# Patient Record
Sex: FEMALE | Race: WHITE | NOT HISPANIC OR LATINO | Employment: FULL TIME | URBAN - METROPOLITAN AREA
[De-identification: names, ages, dates, MRNs, and addresses within clinical notes are randomized per-mention and may not be internally consistent; named-entity substitution may affect disease eponyms.]

---

## 2017-04-25 ENCOUNTER — TRANSCRIBE ORDERS (OUTPATIENT)
Dept: ADMINISTRATIVE | Facility: HOSPITAL | Age: 62
End: 2017-04-25

## 2017-04-25 DIAGNOSIS — Z12.31 VISIT FOR SCREENING MAMMOGRAM: Primary | ICD-10-CM

## 2017-06-28 ENCOUNTER — HOSPITAL ENCOUNTER (OUTPATIENT)
Dept: MAMMOGRAPHY | Facility: HOSPITAL | Age: 62
Discharge: HOME | End: 2017-06-28
Admitting: FAMILY MEDICINE

## 2017-06-28 DIAGNOSIS — Z12.31 VISIT FOR SCREENING MAMMOGRAM: ICD-10-CM

## 2017-06-28 PROCEDURE — 77067 SCR MAMMO BI INCL CAD: CPT | Performed by: RADIOLOGY

## 2017-06-28 PROCEDURE — 77063 BREAST TOMOSYNTHESIS BI: CPT | Performed by: RADIOLOGY

## 2017-06-28 PROCEDURE — 77063 BREAST TOMOSYNTHESIS BI: CPT

## 2017-06-28 PROCEDURE — G0202 SCR MAMMO BI INCL CAD: HCPCS

## 2019-08-20 ENCOUNTER — OFFICE VISIT (OUTPATIENT)
Dept: OBSTETRICS AND GYNECOLOGY | Facility: CLINIC | Age: 64
End: 2019-08-20

## 2019-08-20 VITALS
DIASTOLIC BLOOD PRESSURE: 68 MMHG | SYSTOLIC BLOOD PRESSURE: 110 MMHG | BODY MASS INDEX: 23.21 KG/M2 | HEIGHT: 66 IN | WEIGHT: 144.4 LBS

## 2019-08-20 DIAGNOSIS — Z01.419 ENCOUNTER FOR GYNECOLOGICAL EXAMINATION WITHOUT ABNORMAL FINDING: Primary | ICD-10-CM

## 2019-08-20 DIAGNOSIS — N95.2 VAGINAL ATROPHY: ICD-10-CM

## 2019-08-20 DIAGNOSIS — Z78.0 MENOPAUSE: ICD-10-CM

## 2019-08-20 PROCEDURE — 99386 PREV VISIT NEW AGE 40-64: CPT | Performed by: OBSTETRICS & GYNECOLOGY

## 2019-08-20 RX ORDER — BIOTIN 5 MG
1 TABLET ORAL DAILY
COMMUNITY

## 2019-08-20 RX ORDER — ESTRADIOL 0.1 MG/G
0.5 CREAM VAGINAL 2 TIMES WEEKLY
Qty: 42.5 G | Refills: 3 | Status: SHIPPED | OUTPATIENT
Start: 2019-08-22 | End: 2020-08-21

## 2019-08-20 RX ORDER — ESTRADIOL 0.1 MG/G
1 CREAM VAGINAL AS NEEDED
COMMUNITY
End: 2019-08-20 | Stop reason: SDUPTHER

## 2019-08-20 RX ORDER — VALACYCLOVIR HYDROCHLORIDE 1 G/1
TABLET, FILM COATED ORAL
COMMUNITY

## 2019-08-20 NOTE — PROGRESS NOTES
Chief Complaint   Patient presents with   • Gynecologic Exam       Bre Mckeon is a 64 y.o. year old  presenting to be seen for her annual exam.  Patient has been absent from our care for several years.  She is menopausal and uses estradiol vaginal cream and a dose of 0.5 g twice a week to treat symptoms of vaginal atrophy.  She has no side effects on this medication.  She has a prior history of CO2 laser ablation of her cervix over 30 years ago.  Follow-up Pap tests have been normal.  She has had a TVT procedure.  She has had bilateral breast augmentation.  She has had a suction D&C for a missed AB.  She denies bowel or urinary symptoms.    SCREENING TESTS  No Pap test Data available  Year 2012   Age                         PAP                         HPV high risk                         Mammogram      benign                   KOSTA score                         Breast MRI                         Lipids                         Vitamin D                         Colonoscopy                         DEXA  Frax (hip/any)                         Ovarian Screen                             She exercises regularly: yes.  She wears her seat belt: yes.  She has concerns about domestic violence: no.  She has noticed changes in height: no    GYN screening history:  · Last mammogram: was done on approximately 2017 and the result was: Birads II (Benign findings)..    No Additional Complaints Reported    The following portions of the patient's history were reviewed and updated as appropriate:vital signs and   She  has a past medical history of Abnormal Pap smear of cervix, Fibroid, Lichen sclerosus et atrophicus of the vulva, and Osteopenia.  She does not have any pertinent problems on file.  She  has a past surgical history that includes Augmentation mammaplasty; Laser ablation of the cervix; Bladder  "suspension; d&c with suction; and Bunionectomy (Left).  Her family history includes Breast cancer (age of onset: 65) in her maternal aunt; Breast cancer (age of onset: 70) in her maternal aunt; Osteoporosis in her mother.  She  reports that she has never smoked. She has never used smokeless tobacco. She reports that she drinks alcohol. She reports that she does not use drugs.  Current Outpatient Medications   Medication Sig Dispense Refill   • Krill Oil 1000 MG capsule Take 1 capsule by mouth Daily.     • [START ON 8/22/2019] estradiol (ESTRACE VAGINAL) 0.1 MG/GM vaginal cream Insert 0.5 g into the vagina 2 (Two) Times a Week. 42.5 g 3   • Multiple Vitamins-Minerals (MULTIVITAMIN ADULT PO) Take 1 tablet by mouth Daily.     • valACYclovir (VALTREX) 1000 MG tablet Valtrex 1 gram tablet   2 po q 12 hrs for two doses for fever blisters       No current facility-administered medications for this visit.      She is allergic to penicillins..    Review of Systems  A comprehensive review of systems was taken.  Constitutional: negative for fever, chills, activity change, appetite change, fatigue and unexpected weight change.  Respiratory: negative  Cardiovascular: negative  Gastrointestinal: negative  Genitourinary:negative  Musculoskeletal:negative  Behavioral/Psych: negative       /68   Ht 167.6 cm (66\")   Wt 65.5 kg (144 lb 6.4 oz)   Breastfeeding? No   BMI 23.31 kg/m²     Physical Exam    General:  alert; cooperative; well developed; well nourished   Skin:  No suspicious lesions seen   Thyroid: normal to inspection and palpation   Lungs:  clear to auscultation bilaterally   Heart:  regular rate and rhythm, S1, S2 normal, no murmur, click, rub or gallop   Breasts:  Examined in supine position  Symmetric without masses or skin dimpling  Nipples normal without inversion, lesions or discharge  There are no palpable axillary nodes  Bilateral implants are noted without obvious palpable abnormalities   Abdomen: soft, " non-tender; no masses  no umbilical or inguinal hernias are present  no hepato-splenomegaly   Pelvis: Clinical staff was present for exam  External genitalia:  normal appearance of the external genitalia including Bartholin's and [place]'s glands.  Vaginal:  atrophic mucosal changes are present;  Cervix:  normal appearance.  Uterus:  normal size, shape and consistency. anteverted;  Adnexa:  non palpable bilaterally.  Rectal:  anus visually normal appearing. recto-vaginal exam unremarkable and confirms findings;     Lab Review    No data reviewed    Imaging  Mammogram results- Birads II         ASSESSMENT  Problems Addressed this Visit        Genitourinary    Menopause      Other Visit Diagnoses     Encounter for gynecological examination without abnormal finding    -  Primary    Relevant Orders    Liquid-based Pap Smear, Screening    Vaginal atrophy        Relevant Medications    estradiol (ESTRACE VAGINAL) 0.1 MG/GM vaginal cream (Start on 8/22/2019)          PLAN    Medications prescribed this encounter:    New Medications Ordered This Visit   Medications   • estradiol (ESTRACE VAGINAL) 0.1 MG/GM vaginal cream     Sig: Insert 0.5 g into the vagina 2 (Two) Times a Week.     Dispense:  42.5 g     Refill:  3   · Pap test done  · Monthly self breast assessment and annual breast imaging  · Calcium, 600 mg/ Vit. D, 400 IU daily; regular weight-bearing exercise  · Follow up: 12 month(s)  *Please note that portions of this documentation may have been completed with a voice recognition program.  Efforts were made to edit this dictation, but occasional words may have been mistranscribed.       This note was electronically signed.    YOANDY Harman MD  August 20, 2019  3:49 PM

## 2019-11-22 ENCOUNTER — TRANSCRIBE ORDERS (OUTPATIENT)
Dept: ADMINISTRATIVE | Facility: HOSPITAL | Age: 64
End: 2019-11-22

## 2019-11-22 DIAGNOSIS — Z12.31 VISIT FOR SCREENING MAMMOGRAM: Primary | ICD-10-CM

## 2020-08-27 ENCOUNTER — OFFICE VISIT (OUTPATIENT)
Dept: OBSTETRICS AND GYNECOLOGY | Facility: CLINIC | Age: 65
End: 2020-08-27

## 2020-08-27 VITALS
SYSTOLIC BLOOD PRESSURE: 110 MMHG | TEMPERATURE: 97.8 F | WEIGHT: 156.2 LBS | HEIGHT: 66 IN | DIASTOLIC BLOOD PRESSURE: 62 MMHG | BODY MASS INDEX: 25.1 KG/M2

## 2020-08-27 DIAGNOSIS — Z01.419 ENCOUNTER FOR GYNECOLOGICAL EXAMINATION WITHOUT ABNORMAL FINDING: ICD-10-CM

## 2020-08-27 DIAGNOSIS — N95.2 VAGINAL ATROPHY: ICD-10-CM

## 2020-08-27 DIAGNOSIS — Z78.0 MENOPAUSE: Primary | ICD-10-CM

## 2020-08-27 PROCEDURE — 99396 PREV VISIT EST AGE 40-64: CPT | Performed by: OBSTETRICS & GYNECOLOGY

## 2020-08-27 RX ORDER — ESTRADIOL 0.1 MG/G
0.5 CREAM VAGINAL 2 TIMES WEEKLY
COMMUNITY
End: 2020-08-27 | Stop reason: SDUPTHER

## 2020-08-27 RX ORDER — ESTRADIOL 0.1 MG/G
0.5 CREAM VAGINAL 2 TIMES WEEKLY
Qty: 42.5 G | Refills: 2 | Status: SHIPPED | OUTPATIENT
Start: 2020-08-27 | End: 2021-08-27

## 2020-08-27 NOTE — PROGRESS NOTES
Chief Complaint   Patient presents with   • Annual Exam   • Shan Shaffer       Bre Mckeon is a 65 y.o. year old  presenting to be seen for her annual exam.  This patient is menopausal and does not use systemic estrogen/progestin replacement therapy.  She is currently treated for vaginal atrophy with estradiol cream, 0.5 g intravaginally twice a week she has previously had a suction D&C for spontaneous AB; CO2 laser ablation of the cervix for dysplasia; bilateral breast augmentation; and a TVT procedure for incontinence.  She denies bowel or urinary symptoms.    SCREENING TESTS    Year 2012   Age                         PAP        Neg.                 HPV high risk                         Mammogram      benign                   KOSTA score                         Breast MRI                         Lipids                         Vitamin D                         Colonoscopy                         DEXA  Frax (hip/any)                         Ovarian Screen                             She exercises regularly: yes.  She wears her seat belt: yes.  She has concerns about domestic violence: no.  She has noticed changes in height: no    GYN screening history:  · Last pap: was done on approximately 2019 and the result was: normal PAP.  · Last mammogram: was done on approximately 2017 and the result was: Birads II (Benign findings)..    No Additional Complaints Reported    The following portions of the patient's history were reviewed and updated as appropriate:vital signs and   She  has a past medical history of Abnormal Pap smear of cervix, Fibroid, Lichen sclerosus et atrophicus of the vulva, and Osteopenia.  She does not have any pertinent problems on file.  She  has a past surgical history that includes Augmentation mammaplasty; Laser ablation of the cervix; Bladder suspension; d&c with suction;  "and Bunionectomy (Left).  Her family history includes Breast cancer (age of onset: 65) in her maternal aunt; Breast cancer (age of onset: 70) in her maternal aunt; Osteoporosis in her mother.  She  reports that she has never smoked. She has never used smokeless tobacco. She reports that she drinks alcohol. She reports that she does not use drugs.  Current Outpatient Medications   Medication Sig Dispense Refill   • estradiol (ESTRACE) 0.1 MG/GM vaginal cream Insert 0.5 g into the vagina 2 (Two) Times a Week. 42.5 g 2   • Krill Oil 1000 MG capsule Take 1 capsule by mouth Daily.     • Multiple Vitamins-Minerals (MULTIVITAMIN ADULT PO) Take 1 tablet by mouth Daily.     • valACYclovir (VALTREX) 1000 MG tablet Valtrex 1 gram tablet   2 po q 12 hrs for two doses for fever blisters       No current facility-administered medications for this visit.      She is allergic to penicillins..    Review of Systems  A review of systems was taken.  She denies cough, fever, shortness of breath, or loss of her sense of taste or smell.  Constitutional: negative for fever, chills, activity change, appetite change, fatigue and unexpected weight change.  Respiratory: negative  Cardiovascular: negative  Gastrointestinal: negative  Genitourinary:negative  Musculoskeletal:negative  Behavioral/Psych: negative       /62   Temp 97.8 °F (36.6 °C)   Ht 167.6 cm (66\")   Wt 70.9 kg (156 lb 3.2 oz)   Breastfeeding No   BMI 25.21 kg/m²     Physical Exam    General:  alert; cooperative; well developed; well nourished   Skin:  No suspicious lesions seen   Thyroid: normal to inspection and palpation   Lungs:  clear to auscultation bilaterally   Heart:  regular rate and rhythm, S1, S2 normal, no murmur, click, rub or gallop   Breasts:  Examined in supine position  Symmetric without masses or skin dimpling  Nipples normal without inversion, lesions or discharge  There are no palpable axillary nodes  Bilateral implants are noted without obvious " palpable abnormalities   Abdomen: soft, non-tender; no masses  no umbilical or inguinal hernias are present  no hepato-splenomegaly   Pelvis: Clinical staff was present for exam  External genitalia:  normal appearance of the external genitalia including Bartholin's and [place]'s glands.  Vaginal:  atrophic mucosal changes are present;  Cervix:  normal appearance.  Uterus:  normal size, shape and consistency. anteverted;  Adnexa:  non palpable bilaterally.  Rectal:  anus visually normal appearing. recto-vaginal exam unremarkable and confirms findings;     Lab Review   No data reviewed    Imaging  Mammogram results         ASSESSMENT  Problems Addressed this Visit        Genitourinary    Menopause - Primary    Vaginal atrophy    Relevant Medications    estradiol (ESTRACE) 0.1 MG/GM vaginal cream      Other Visit Diagnoses     Encounter for gynecological examination without abnormal finding                  Substance History:   reports that she has never smoked. She has never used smokeless tobacco.   reports that she drinks alcohol.   reports that she does not use drugs.    Substance use counseling is not indicated based on patient history.  She indicates that her alcohol intake is social, and controlled.      PLAN    Medications prescribed this encounter:    New Medications Ordered This Visit   Medications   • estradiol (ESTRACE) 0.1 MG/GM vaginal cream     Sig: Insert 0.5 g into the vagina 2 (Two) Times a Week.     Dispense:  42.5 g     Refill:  2   · Monthly self breast assessment, the patient must schedule breast imaging  · Calcium, 600 mg/ Vit. D, 400 IU daily; regular weight-bearing exercise  · Follow up: 12 month(s)  *Please note that portions of this documentation may have been completed with a voice recognition program.  Efforts were made to edit this dictation, but occasional words may have been mistranscribed.       This note was electronically signed.    YOANDY Harman MD  August 27, 2020  09:39

## 2020-09-17 ENCOUNTER — HOSPITAL ENCOUNTER (OUTPATIENT)
Dept: MAMMOGRAPHY | Facility: HOSPITAL | Age: 65
Discharge: HOME | End: 2020-09-17
Admitting: FAMILY MEDICINE

## 2020-09-17 DIAGNOSIS — Z12.31 VISIT FOR SCREENING MAMMOGRAM: ICD-10-CM

## 2020-09-17 PROCEDURE — 77067 SCR MAMMO BI INCL CAD: CPT

## 2020-09-17 PROCEDURE — 77063 BREAST TOMOSYNTHESIS BI: CPT | Performed by: RADIOLOGY

## 2020-09-17 PROCEDURE — 77067 SCR MAMMO BI INCL CAD: CPT | Performed by: RADIOLOGY

## 2020-09-17 PROCEDURE — 77063 BREAST TOMOSYNTHESIS BI: CPT

## 2021-09-01 ENCOUNTER — OFFICE VISIT (OUTPATIENT)
Dept: OBSTETRICS AND GYNECOLOGY | Facility: CLINIC | Age: 66
End: 2021-09-01

## 2021-09-01 VITALS
HEIGHT: 66 IN | DIASTOLIC BLOOD PRESSURE: 68 MMHG | WEIGHT: 148 LBS | BODY MASS INDEX: 23.78 KG/M2 | SYSTOLIC BLOOD PRESSURE: 118 MMHG

## 2021-09-01 DIAGNOSIS — Z01.419 ENCOUNTER FOR GYNECOLOGICAL EXAMINATION WITHOUT ABNORMAL FINDING: Primary | ICD-10-CM

## 2021-09-01 DIAGNOSIS — Z78.0 MENOPAUSE: ICD-10-CM

## 2021-09-01 DIAGNOSIS — N95.2 VAGINAL ATROPHY: ICD-10-CM

## 2021-09-01 PROCEDURE — 99397 PER PM REEVAL EST PAT 65+ YR: CPT | Performed by: OBSTETRICS & GYNECOLOGY

## 2021-09-01 RX ORDER — ESTRADIOL 0.1 MG/G
0.5 CREAM VAGINAL 2 TIMES WEEKLY
COMMUNITY
End: 2021-12-20 | Stop reason: SDUPTHER

## 2021-09-01 RX ORDER — ASPIRIN 81 MG/1
81 TABLET ORAL EVERY OTHER DAY
COMMUNITY

## 2021-09-01 NOTE — PROGRESS NOTES
Chief Complaint   Patient presents with   • Annual Exam   • Med Refill     patient will call for refills on estrace vaginal cream.       Bre Mckeon is a 66 y.o. year old  presenting to be seen for her annual exam.  This patient is menopausal and does not use systemic estrogen/progestin replacement therapy.  Vaginal atrophy is treated with estradiol cream in a dose of 0.5 g intravaginally twice a week.  She has no side effects on this medication.  She has previously had CO2 laser ablation of her cervix, a suction D&C for a missed AB, bilateral breast augmentation, and a TVT procedure.  She denies bowel or urinary symptoms.  She has had Covid-19 immunization.    SCREENING TESTS    Year 2012   Age                         PAP        Neg.                 HPV high risk                         Mammogram        benign benign                KOSTA score                         Breast MRI                         Lipids                         Vitamin D                         Colonoscopy                         DEXA  Frax (hip/any)                         Ovarian Screen                             She exercises regularly: yes.  She wears her seat belt: yes.  She has concerns about domestic violence: no.  She has noticed changes in height: no    GYN screening history:  · Last pap: was done on approximately 2019 and the result was: normal PAP.  · Last mammogram: was done on approximately 2020 and the result was: Birads I (Normal)..    No Additional Complaints Reported    The following portions of the patient's history were reviewed and updated as appropriate:vital signs and   She  has a past medical history of Abnormal Pap smear of cervix, Family history of DVT, Fibroid, Lichen sclerosus et atrophicus of the vulva, and Osteopenia.  She does not have any pertinent problems on file.  She  has a past  "surgical history that includes Laser ablation of the cervix; Bladder suspension; d & c with suction; Bunionectomy (Left); and Augmentation mammaplasty (Bilateral, 1988).  Her family history includes Breast cancer (age of onset: 65) in her maternal aunt; Breast cancer (age of onset: 70) in her maternal aunt; Breast cancer (age of onset: 75) in her paternal uncle; Deep vein thrombosis in her mother; Osteoporosis in her mother.  She  reports that she has never smoked. She has never used smokeless tobacco. She reports current alcohol use. She reports that she does not use drugs.  Current Outpatient Medications   Medication Sig Dispense Refill   • aspirin 81 MG EC tablet Take 81 mg by mouth Every Other Day.     • calcium carbonate-vitamin d (Calcium 600+D) 600-400 MG-UNIT per tablet Take 1 tablet by mouth Daily.     • estradiol (ESTRACE) 0.1 MG/GM vaginal cream Insert 0.5 g into the vagina 2 (Two) Times a Week.     • Krill Oil 1000 MG capsule Take 1 capsule by mouth Daily.     • Multiple Vitamins-Minerals (MULTIVITAMIN ADULT PO) Take 1 tablet by mouth Daily.     • valACYclovir (VALTREX) 1000 MG tablet Valtrex 1 gram tablet   2 po q 12 hrs for two doses for fever blisters       No current facility-administered medications for this visit.     She is allergic to penicillins..    Review of Systems  A review of systems was taken.  Constitutional: negative for fever, chills, activity change, appetite change, fatigue and unexpected weight change.  Respiratory: negative  Cardiovascular: negative  Gastrointestinal: negative  Genitourinary:negative  Musculoskeletal:negative  Behavioral/Psych: negative     Counseling/Anticipatory Guidance Discussed: nutrition, physical activity, healthy weight, immunizations, screenings and self-breast exam      /68   Ht 167.6 cm (66\")   Wt 67.1 kg (148 lb)   Breastfeeding No   BMI 23.89 kg/m²     BMI reviewed     MEDICALLY INDICATED   Physical Exam    Neuro: alert and oriented to person, " place and time    General:  alert; cooperative; well developed; well nourished   Skin:  No suspicious lesions seen   Thyroid: normal to inspection and palpation   Lungs:  breathing is unlabored  clear to auscultation bilaterally   Heart:  regular rate and rhythm, S1, S2 normal, no murmur, click, rub or gallop  normal apical impulse   Breasts:  Examined in supine position  Symmetric without masses or skin dimpling  Nipples normal without inversion, lesions or discharge  There are no palpable axillary nodes  Bilateral implants are noted without obvious palpable abnormalities   Abdomen: soft, non-tender; no masses  no umbilical or inguinal hernias are present  no hepato-splenomegaly   Pelvis: Clinical staff was present for exam  External genitalia:  normal appearance of the external genitalia including Bartholin's and [place]'s glands.  Vaginal:  normal pink mucosa without prolapse or lesions.  Cervix:  normal appearance.  Uterus:  normal size, shape and consistency. anteverted;  Adnexa:  non palpable bilaterally.  Rectal:  anus visually normal appearing. recto-vaginal exam unremarkable and confirms findings;     Lab Review   Pap results    Imaging  Mammogram results              ASSESSMENT  Problems Addressed this Visit        Genitourinary and Reproductive     Menopause    Vaginal atrophy      Other Visit Diagnoses     Encounter for gynecological examination without abnormal finding    -  Primary      Diagnoses       Codes Comments    Encounter for gynecological examination without abnormal finding    -  Primary ICD-10-CM: Z01.419  ICD-9-CM: V72.31     Menopause     ICD-10-CM: Z78.0  ICD-9-CM: 627.2     Vaginal atrophy     ICD-10-CM: N95.2  ICD-9-CM: 627.3               Substance History:   reports that she has never smoked. She has never used smokeless tobacco.   reports current alcohol use.   reports no history of drug use.    Substance use counseling is not indicated based on patient history.  She indicates that her  alcohol intake is social, and controlled.      PLAN    · Medications prescribed this encounter:  No orders of the defined types were placed in this encounter.  · Monthly self breast assessment and annual breast imaging  · Calcium, 600 mg/ Vit. D, 400 IU daily; regular weight-bearing exercise  · Follow up: 12 month(s)  *Please note that portions of this documentation may have been completed with a voice recognition program.  Efforts were made to edit this dictation, but occasional words may have been mistranscribed.       This note was electronically signed.    YOANDY Harman MD  September 1, 2021  10:37 EDT

## 2021-12-20 ENCOUNTER — TELEPHONE (OUTPATIENT)
Dept: OBSTETRICS AND GYNECOLOGY | Facility: CLINIC | Age: 66
End: 2021-12-20

## 2021-12-20 RX ORDER — ESTRADIOL 0.1 MG/G
0.5 CREAM VAGINAL 2 TIMES WEEKLY
Qty: 1 EACH | Refills: 3 | Status: SHIPPED | OUTPATIENT
Start: 2021-12-20 | End: 2022-09-02 | Stop reason: SDUPTHER

## 2021-12-20 NOTE — TELEPHONE ENCOUNTER
Patient was calling for a refill of Estrace.  She states that Kimani was to fill this at her last visit (9/2021) but I didn't see any record of that in the system.  She is scheduled with Tricia in September and is wondering if we can send this in for her until the time she has her annual.

## 2022-04-22 ENCOUNTER — TELEPHONE (OUTPATIENT)
Dept: OBSTETRICS AND GYNECOLOGY | Facility: CLINIC | Age: 67
End: 2022-04-22

## 2022-04-22 RX ORDER — FLUCONAZOLE 150 MG/1
150 TABLET ORAL DAILY
Qty: 1 TABLET | Refills: 0 | Status: SHIPPED | OUTPATIENT
Start: 2022-04-22 | End: 2022-09-02

## 2022-04-28 ENCOUNTER — TRANSCRIBE ORDERS (OUTPATIENT)
Dept: ADMINISTRATIVE | Facility: HOSPITAL | Age: 67
End: 2022-04-28

## 2022-04-28 DIAGNOSIS — Z12.31 VISIT FOR SCREENING MAMMOGRAM: Primary | ICD-10-CM

## 2022-05-26 ENCOUNTER — APPOINTMENT (OUTPATIENT)
Dept: MAMMOGRAPHY | Facility: HOSPITAL | Age: 67
End: 2022-05-26

## 2022-06-08 ENCOUNTER — APPOINTMENT (OUTPATIENT)
Dept: MAMMOGRAPHY | Facility: HOSPITAL | Age: 67
End: 2022-06-08

## 2022-06-23 ENCOUNTER — APPOINTMENT (OUTPATIENT)
Dept: MAMMOGRAPHY | Facility: HOSPITAL | Age: 67
End: 2022-06-23

## 2022-07-11 ENCOUNTER — HOSPITAL ENCOUNTER (OUTPATIENT)
Dept: MAMMOGRAPHY | Facility: HOSPITAL | Age: 67
Discharge: HOME | End: 2022-07-11
Admitting: INTERNAL MEDICINE

## 2022-07-11 ENCOUNTER — HOSPITAL ENCOUNTER (OUTPATIENT)
Dept: MAMMOGRAPHY | Facility: HOSPITAL | Age: 67
Discharge: HOME | End: 2022-07-11

## 2022-07-11 DIAGNOSIS — Z12.31 VISIT FOR SCREENING MAMMOGRAM: ICD-10-CM

## 2022-07-11 PROCEDURE — 77067 SCR MAMMO BI INCL CAD: CPT

## 2022-07-11 PROCEDURE — 77063 BREAST TOMOSYNTHESIS BI: CPT | Performed by: RADIOLOGY

## 2022-07-11 PROCEDURE — 77063 BREAST TOMOSYNTHESIS BI: CPT

## 2022-07-11 PROCEDURE — 77067 SCR MAMMO BI INCL CAD: CPT | Performed by: RADIOLOGY

## 2022-09-02 ENCOUNTER — OFFICE VISIT (OUTPATIENT)
Dept: OBSTETRICS AND GYNECOLOGY | Facility: CLINIC | Age: 67
End: 2022-09-02

## 2022-09-02 VITALS
DIASTOLIC BLOOD PRESSURE: 70 MMHG | BODY MASS INDEX: 23.73 KG/M2 | RESPIRATION RATE: 16 BRPM | SYSTOLIC BLOOD PRESSURE: 118 MMHG | WEIGHT: 147 LBS

## 2022-09-02 DIAGNOSIS — M85.80 OSTEOPENIA, UNSPECIFIED LOCATION: ICD-10-CM

## 2022-09-02 DIAGNOSIS — N95.2 VAGINAL ATROPHY: ICD-10-CM

## 2022-09-02 DIAGNOSIS — Z87.42 HISTORY OF ABNORMAL CERVICAL PAP SMEAR: ICD-10-CM

## 2022-09-02 DIAGNOSIS — Z01.419 ENCOUNTER FOR GYNECOLOGICAL EXAMINATION WITHOUT ABNORMAL FINDING: Primary | ICD-10-CM

## 2022-09-02 PROCEDURE — 99397 PER PM REEVAL EST PAT 65+ YR: CPT | Performed by: NURSE PRACTITIONER

## 2022-09-02 RX ORDER — MIDODRINE HYDROCHLORIDE 5 MG/1
5 TABLET ORAL 3 TIMES DAILY
COMMUNITY
Start: 2022-07-17

## 2022-09-02 RX ORDER — ESTRADIOL 0.1 MG/G
2 CREAM VAGINAL DAILY
COMMUNITY
End: 2022-09-02 | Stop reason: DRUGHIGH

## 2022-09-02 RX ORDER — ESTRADIOL 0.1 MG/G
0.5 CREAM VAGINAL 2 TIMES WEEKLY
Qty: 1 EACH | Refills: 3 | Status: SHIPPED | OUTPATIENT
Start: 2022-09-05

## 2022-09-02 NOTE — PROGRESS NOTES
Annual Visit     Patient Name: Bre Mckeon  : 1955   MRN: [id number]   Care Team: Patient Care Team:  Inés Sheets MD as PCP - General  Inés Sheets MD as PCP - Family Medicine  Shukri Harman MD (Inactive) as Consulting Physician (Gynecology)    Chief Complaint:    Chief Complaint   Patient presents with   • Annual Exam       HPI: Bre Mckeon is a 67 y.o. year old  presenting to be seen for her gynecologic exam.   Pap smear 2019 WNL   Reports hx of abnormal pap results in the last 10 yrs - states she was seen briefly by another provider   Hx laser txment of cervix many yrs ago     Doing well with estradiol vaginal cream - needs refills     Mammogram 2022 birads 1     Colonoscopy UTD per pt - PCP orders     DEXA done at [facility] with PCP   Hx osteopenia   Takes daily calcium and vit d     Hx TVT - SUMA is minimal now     Was a pt of Dr. Figueredo then Dr. Carrero, then Dr. Harman       Subjective      /70   Resp 16   Wt 66.7 kg (147 lb)   Breastfeeding No   BMI 23.73 kg/m²     BMI reviewed: Body mass index is 23.73 kg/m².      Objective     Physical Exam    Neuro: alert and oriented to person, place and time   General:  alert; cooperative; well developed; well nourished   Skin:  No suspicious lesions seen   Thyroid: normal to inspection and palpation   Lungs:  breathing is unlabored  clear to auscultation bilaterally   Heart:  regular rate and rhythm, S1, S2 normal, no murmur, click, rub or gallop  normal apical impulse   Breasts:  Examined in supine position  Symmetric without masses or skin dimpling  Nipples normal without inversion, lesions or discharge  There are no palpable axillary nodes  Bilateral implants are noted without obvious palpable abnormalities   Abdomen: soft, non-tender; no masses  no umbilical or inguinal hernias are present  no hepato-splenomegaly   Pelvis: Clinical staff was present for exam  External genitalia:  normal  appearance of the external genitalia including Bartholin's and [place]'s glands.  :  urethral meatus normal;  Vaginal:  normal pink mucosa without prolapse or lesions.  Cervix:  normal appearance.  Uterus:  normal size, shape and consistency.  Adnexa:  normal bimanual exam of the adnexa.  Rectal:  digital rectal exam not performed; anus visually normal appearing.         Assessment / Plan      Assessment  Problems Addressed This Visit    ICD-10-CM ICD-9-CM   1. Encounter for gynecological examination without abnormal finding  Z01.419 V72.31   2. History of abnormal cervical Pap smear  Z87.42 V13.29   3. Vaginal atrophy  N95.2 627.3   4. Osteopenia, unspecified location  M85.80 733.90       Plan    Pap smear pending  Discussed cessation of pap smear at age 66, but considering hx of abnormal pap results will rpt pap today     Discussed monthly SBEs and importance of annual imaging     Cont with estrace vaginal cream - refills to pharmacy     Discussed osteopenia and Calcium, 600 mg/ Vit. D, 400 IU daily; regular weight-bearing exercise    AV 1 yr         Follow Up  Return in about 1 year (around 9/2/2023) for Annual physical.  Patient was given instructions and counseling regarding her condition or for health maintenance advice. Please see specific information pulled into the AVS if appropriate.     Tricia Purvis, HALIE  September 2, 2022  10:31 EDT

## 2022-09-09 LAB — REF LAB TEST METHOD: NORMAL

## 2022-12-19 ENCOUNTER — TELEPHONE (OUTPATIENT)
Dept: OBSTETRICS AND GYNECOLOGY | Facility: CLINIC | Age: 67
End: 2022-12-19

## 2022-12-19 RX ORDER — FLUCONAZOLE 150 MG/1
TABLET ORAL
Qty: 3 TABLET | Refills: 0 | Status: SHIPPED | OUTPATIENT
Start: 2022-12-19

## 2022-12-19 NOTE — TELEPHONE ENCOUNTER
Kate,  Please let her know that I have sent in diflucan - if that doesn't take care of her sx, then let us know.     Thanks!

## 2024-01-11 ENCOUNTER — OFFICE VISIT (OUTPATIENT)
Dept: OBSTETRICS AND GYNECOLOGY | Facility: CLINIC | Age: 69
End: 2024-01-11
Payer: COMMERCIAL

## 2024-01-11 VITALS
HEIGHT: 66 IN | BODY MASS INDEX: 25.75 KG/M2 | DIASTOLIC BLOOD PRESSURE: 70 MMHG | SYSTOLIC BLOOD PRESSURE: 110 MMHG | WEIGHT: 160.2 LBS

## 2024-01-11 DIAGNOSIS — N95.2 ATROPHY OF VAGINA: ICD-10-CM

## 2024-01-11 DIAGNOSIS — R87.810 CERVICAL HIGH RISK HPV (HUMAN PAPILLOMAVIRUS) TEST POSITIVE: ICD-10-CM

## 2024-01-11 DIAGNOSIS — Z01.411 ENCOUNTER FOR GYNECOLOGICAL EXAMINATION WITH ABNORMAL FINDING: Primary | ICD-10-CM

## 2024-01-11 RX ORDER — ESTRADIOL 0.1 MG/G
1 CREAM VAGINAL 2 TIMES WEEKLY
Qty: 42.5 G | Refills: 3 | Status: SHIPPED | OUTPATIENT
Start: 2024-01-11

## 2024-01-11 NOTE — PROGRESS NOTES
Chief Complaint  Bre Mckeon is a 68 y.o.  female presenting for Annual Exam (Repeat pap.  ) and Med Refill (Estradiol vaginal cream 0.1 mg/gm (1 tube with refills).  Antonia Jenkins.)    History of Present Illness  Bre is a very pleasant 69yo woman, , here for annual gyn exam.  Her past surgical history includes, in part, a D&C, laser Tx of cervix, a bladder suspension (TVT), and breast augmentation.  Her pap smear in 2022 was negative, but + HR HPV pool  (actually neg for 16/18/45).  She is willing to do repeat testing today.  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  PMHx lichen sclerosus, and osteopenia. (DEXA through PCP, Dr. Inés Sheets at )  Last mammogram 22 (ph # will be given today & pt will call for appt.)  Up to date on colonoscopy (also gets these through Dr. Sheets).  Otherwise, ROS neg    The following portions of the patient's history were reviewed and updated as appropriate: allergies, current medications, past family history, past medical history, past social history, past surgical history, and problem list.    Allergies   Allergen Reactions    Penicillins Rash         Current Outpatient Medications:     estradiol (ESTRACE) 0.1 MG/GM vaginal cream, Insert 1 applicator into the vagina 2 (Two) Times a Week., Disp: 42.5 g, Rfl: 3    aspirin 81 MG EC tablet, Take 81 mg by mouth Every Other Day., Disp: , Rfl:     Krill Oil 1000 MG capsule, Take 1 capsule by mouth Daily., Disp: , Rfl:     Multiple Vitamins-Minerals (MULTIVITAMIN ADULT PO), Take 1 tablet by mouth Daily., Disp: , Rfl:     TURMERIC PO, Take  by mouth., Disp: , Rfl:     valACYclovir (VALTREX) 1000 MG tablet, Valtrex 1 gram tablet  2 po q 12 hrs for two doses for fever blisters, Disp: , Rfl:     Past Medical History:   Diagnosis Date    Abnormal Pap smear of cervix     Family history of breast cancer in mother     Family history of DVT     patient has had  "genetic testing that states that she is at increased risk for DVT    Fibroid     Lichen sclerosus et atrophicus of the vulva     Osteopenia         Past Surgical History:   Procedure Laterality Date    AUGMENTATION MAMMAPLASTY Bilateral 1988    subglandular double lumen    BLADDER SUSPENSION      BUNIONECTOMY Left     D & C WITH SUCTION      LASER ABLATION OF THE CERVIX         Objective  /70   Ht 167.6 cm (66\")   Wt 72.7 kg (160 lb 3.2 oz)   Breastfeeding No   BMI 25.86 kg/m²     Physical Exam  Vitals and nursing note reviewed. Exam conducted with a chaperone present.   Constitutional:       General: She is not in acute distress.     Appearance: Normal appearance. She is not ill-appearing.   HENT:      Head: Normocephalic.   Neck:      Thyroid: No thyroid mass or thyromegaly.   Cardiovascular:      Rate and Rhythm: Normal rate and regular rhythm.      Heart sounds: Normal heart sounds. No murmur heard.  Pulmonary:      Effort: Pulmonary effort is normal. No respiratory distress.      Breath sounds: Normal breath sounds.   Chest:   Breasts:     Right: No inverted nipple, mass or nipple discharge.      Left: No inverted nipple, mass or nipple discharge.   Abdominal:      Palpations: Abdomen is soft. There is no mass.      Tenderness: There is no abdominal tenderness.   Genitourinary:     General: Normal vulva.      Labia:         Right: No rash, tenderness or lesion.         Left: No rash, tenderness or lesion.       Vagina: Normal. Erythema present. No vaginal discharge.      Cervix: No discharge, lesion or erythema.      Uterus: Not enlarged and not tender.       Adnexa:         Right: No mass or tenderness.          Left: No mass or tenderness.        Comments: Marked pallor of the vaginal mucosa.  No abnormal discharge.  Anus appears wnl.  No rectal exam performed.  Lymphadenopathy:      Upper Body:      Right upper body: No supraclavicular or axillary adenopathy.      Left upper body: No " supraclavicular or axillary adenopathy.   Skin:     General: Skin is warm and dry.   Neurological:      Mental Status: She is alert and oriented to person, place, and time.   Psychiatric:         Mood and Affect: Mood normal.         Behavior: Behavior normal.         Assessment/Plan   Diagnoses and all orders for this visit:    1. Encounter for gynecological examination with abnormal finding (Primary)    2. Atrophy of vagina    3. Cervical high risk HPV (human papillomavirus) test positive  -     LIQUID-BASED PAP SMEAR WITH HPV GENOTYPING REGARDLESS OF INTERPRETATION ([facility]ANDRO,[facility])    Other orders  -     estradiol (ESTRACE) 0.1 MG/GM vaginal cream; Insert 1 applicator into the vagina 2 (Two) Times a Week.  Dispense: 42.5 g; Refill: 3        Procedures    40 to 64: Counseling/Anticipatory Guidance Discussed: screenings and self-breast exam    Return in about 1 year (around 1/11/2025) for Annual physical, Phone # for setting up mammo.    Emelia Aguilar, HALIE  01/11/2024

## 2024-01-15 LAB — REF LAB TEST METHOD: NORMAL

## 2024-04-29 ENCOUNTER — APPOINTMENT (OUTPATIENT)
Dept: GENERAL RADIOLOGY | Facility: HOSPITAL | Age: 69
End: 2024-04-29
Payer: COMMERCIAL

## 2024-04-29 ENCOUNTER — HOSPITAL ENCOUNTER (EMERGENCY)
Facility: HOSPITAL | Age: 69
Discharge: HOME | End: 2024-04-29
Attending: EMERGENCY MEDICINE | Admitting: EMERGENCY MEDICINE
Payer: COMMERCIAL

## 2024-04-29 VITALS
SYSTOLIC BLOOD PRESSURE: 137 MMHG | BODY MASS INDEX: 24.91 KG/M2 | HEART RATE: 73 BPM | DIASTOLIC BLOOD PRESSURE: 84 MMHG | OXYGEN SATURATION: 99 % | TEMPERATURE: 97.5 F | HEIGHT: 66 IN | WEIGHT: 155 LBS | RESPIRATION RATE: 16 BRPM

## 2024-04-29 DIAGNOSIS — S52.571A OTHER CLOSED INTRA-ARTICULAR FRACTURE OF DISTAL END OF RIGHT RADIUS, INITIAL ENCOUNTER: ICD-10-CM

## 2024-04-29 DIAGNOSIS — S52.124A CLOSED NONDISPLACED FRACTURE OF HEAD OF RIGHT RADIUS, INITIAL ENCOUNTER: Primary | ICD-10-CM

## 2024-04-29 PROCEDURE — 99283 EMERGENCY DEPT VISIT LOW MDM: CPT

## 2024-04-29 PROCEDURE — 73030 X-RAY EXAM OF SHOULDER: CPT

## 2024-04-29 PROCEDURE — 73070 X-RAY EXAM OF ELBOW: CPT

## 2024-04-29 PROCEDURE — 73110 X-RAY EXAM OF WRIST: CPT

## 2024-04-29 PROCEDURE — 73130 X-RAY EXAM OF HAND: CPT
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
## 2024-04-29 NOTE — DISCHARGE INSTRUCTIONS
I recommend you take Tylenol 650 mg every 6 hours and ibuprofen 400 mg every 6 hours as needed for pain unless you have a contraindication to these medications    Keep your arm in a sling except to come out of the sling once daily for gentle range of motion of the shoulder to prevent stiffness.  Call to schedule a close follow-up appointment with Dr. Gonzalez or one of his partners.  Return to the ER as needed for new or worsening symptoms

## 2024-06-11 ENCOUNTER — TRANSCRIBE ORDERS (OUTPATIENT)
Dept: ADMINISTRATIVE | Facility: HOSPITAL | Age: 69
End: 2024-06-11
Payer: COMMERCIAL

## 2024-06-11 DIAGNOSIS — Z12.31 ENCOUNTER FOR SCREENING MAMMOGRAM FOR MALIGNANT NEOPLASM OF BREAST: Primary | ICD-10-CM

## 2024-07-03 ENCOUNTER — HOSPITAL ENCOUNTER (OUTPATIENT)
Facility: HOSPITAL | Age: 69
Discharge: HOME | End: 2024-07-03
Admitting: FAMILY MEDICINE
Payer: COMMERCIAL

## 2024-07-03 DIAGNOSIS — Z12.31 ENCOUNTER FOR SCREENING MAMMOGRAM FOR MALIGNANT NEOPLASM OF BREAST: ICD-10-CM

## 2024-07-03 PROCEDURE — 77067 SCR MAMMO BI INCL CAD: CPT

## 2024-07-03 PROCEDURE — 77063 BREAST TOMOSYNTHESIS BI: CPT

## 2025-07-30 ENCOUNTER — TELEPHONE (OUTPATIENT)
Dept: CARDIOLOGY | Facility: CLINIC | Age: 70
End: 2025-07-30

## 2025-08-12 ENCOUNTER — TRANSCRIBE ORDERS (OUTPATIENT)
Dept: ADMINISTRATIVE | Facility: HOSPITAL | Age: 70
End: 2025-08-12
Payer: COMMERCIAL

## 2025-08-12 DIAGNOSIS — R06.09 OTHER FORMS OF DYSPNEA: Primary | ICD-10-CM
